# Patient Record
(demographics unavailable — no encounter records)

---

## 2024-11-01 NOTE — PHYSICAL EXAM
[Alert] : alert [No Acute Distress] : no acute distress [Playful] : playful [Normocephalic] : normocephalic [Conjunctivae with no discharge] : conjunctivae with no discharge [PERRL] : PERRL [EOMI Bilateral] : EOMI bilateral [Auricles Well Formed] : auricles well formed [Clear Tympanic membranes with present light reflex and bony landmarks] : clear tympanic membranes with present light reflex and bony landmarks [No Discharge] : no discharge [Nares Patent] : nares patent [Pink Nasal Mucosa] : pink nasal mucosa [Palate Intact] : palate intact [Uvula Midline] : uvula midline [Nonerythematous Oropharynx] : nonerythematous oropharynx [No Caries] : no caries [Trachea Midline] : trachea midline [Supple, full passive range of motion] : supple, full passive range of motion [No Palpable Masses] : no palpable masses [Symmetric Chest Rise] : symmetric chest rise [Clear to Auscultation Bilaterally] : clear to auscultation bilaterally [Normoactive Precordium] : normoactive precordium [Regular Rate and Rhythm] : regular rate and rhythm [Normal S1, S2 present] : normal S1, S2 present [No Murmurs] : no murmurs [+2 Femoral Pulses] : +2 femoral pulses [Soft] : soft [NonTender] : non tender [Non Distended] : non distended [Normoactive Bowel Sounds] : normoactive bowel sounds [No Hepatomegaly] : no hepatomegaly [No Splenomegaly] : no splenomegaly [Kme 1] : Kem 1 [Central Urethral Opening] : central urethral opening [Testicles Descended Bilaterally] : testicles descended bilaterally [Patent] : patent [Normally Placed] : normally placed [No Abnormal Lymph Nodes Palpated] : no abnormal lymph nodes palpated [Symmetric Buttocks Creases] : symmetric buttocks creases [Symmetric Hip Rotation] : symmetric hip rotation [No Gait Asymmetry] : no gait asymmetry [No pain or deformities with palpation of bone, muscles, joints] : no pain or deformities with palpation of bone, muscles, joints [Normal Muscle Tone] : normal muscle tone [No Spinal Dimple] : no spinal dimple [NoTuft of Hair] : no tuft of hair [Straight] : straight [+2 Patella DTR] : +2 patella DTR [Cranial Nerves Grossly Intact] : cranial nerves grossly intact [No Rash or Lesions] : no rash or lesions [Uncircumcised] : uncircumcised [FreeTextEntry6] : small amount of redness underneath the foreskin

## 2024-11-01 NOTE — HISTORY OF PRESENT ILLNESS
[Mother] : mother [Sugar drinks] : sugar drinks [Fruit] : fruit [Vegetables] : vegetables [Meat] : meat [Grains] : grains [Eggs] : eggs [Fish] : fish [Dairy] : dairy [___ stools per day] : [unfilled]  stools per day [Toilet Trained] :  toilet trained [Normal] : Normal [Brushing teeth] : Brushing teeth [Yes] : Patient goes to dentist yearly [Toothpaste] : Primary Fluoride Source: Toothpaste [Playtime (60 min/d)] : Playtime 60 min a day [< 2 hrs of screen time] : Less than 2 hrs of screen time [In ] : In  [No] : Not at  exposure [Water heater temperature set at <120 degrees F] : Water heater temperature set at <120 degrees F [Car seat in back seat] : Car seat in back seat [Carbon Monoxide Detectors] : Carbon monoxide detectors [Smoke Detectors] : Smoke detectors [Supervised outdoor play] : Supervised outdoor play [FreeTextEntry7] : 722595 [Exposure to electronic nicotine delivery system] : No exposure to electronic nicotine delivery system [de-identified] :  127312 [de-identified] : no IEP, no therapies  [de-identified] : not a healthy eater,  [FreeTextEntry1] : both have the cold- cough is improving 2 weeks, no fevers,  irritation on the penis- 3 days, slightly painful, is improving

## 2024-11-01 NOTE — PHYSICAL EXAM
[Alert] : alert [No Acute Distress] : no acute distress [Playful] : playful [Normocephalic] : normocephalic [Conjunctivae with no discharge] : conjunctivae with no discharge [PERRL] : PERRL [EOMI Bilateral] : EOMI bilateral [Auricles Well Formed] : auricles well formed [Clear Tympanic membranes with present light reflex and bony landmarks] : clear tympanic membranes with present light reflex and bony landmarks [No Discharge] : no discharge [Nares Patent] : nares patent [Pink Nasal Mucosa] : pink nasal mucosa [Palate Intact] : palate intact [Uvula Midline] : uvula midline [Nonerythematous Oropharynx] : nonerythematous oropharynx [No Caries] : no caries [Trachea Midline] : trachea midline [Supple, full passive range of motion] : supple, full passive range of motion [No Palpable Masses] : no palpable masses [Symmetric Chest Rise] : symmetric chest rise [Clear to Auscultation Bilaterally] : clear to auscultation bilaterally [Normoactive Precordium] : normoactive precordium [Regular Rate and Rhythm] : regular rate and rhythm [Normal S1, S2 present] : normal S1, S2 present [No Murmurs] : no murmurs [+2 Femoral Pulses] : +2 femoral pulses [Soft] : soft [NonTender] : non tender [Non Distended] : non distended [Normoactive Bowel Sounds] : normoactive bowel sounds [No Hepatomegaly] : no hepatomegaly [No Splenomegaly] : no splenomegaly [Kem 1] : Kem 1 [Central Urethral Opening] : central urethral opening [Testicles Descended Bilaterally] : testicles descended bilaterally [Patent] : patent [Normally Placed] : normally placed [No Abnormal Lymph Nodes Palpated] : no abnormal lymph nodes palpated [Symmetric Buttocks Creases] : symmetric buttocks creases [Symmetric Hip Rotation] : symmetric hip rotation [No Gait Asymmetry] : no gait asymmetry [No pain or deformities with palpation of bone, muscles, joints] : no pain or deformities with palpation of bone, muscles, joints [Normal Muscle Tone] : normal muscle tone [No Spinal Dimple] : no spinal dimple [NoTuft of Hair] : no tuft of hair [Straight] : straight [+2 Patella DTR] : +2 patella DTR [Cranial Nerves Grossly Intact] : cranial nerves grossly intact [No Rash or Lesions] : no rash or lesions [Uncircumcised] : uncircumcised [FreeTextEntry6] : small amount of redness underneath the foreskin

## 2024-11-01 NOTE — HISTORY OF PRESENT ILLNESS
[Mother] : mother [Sugar drinks] : sugar drinks [Fruit] : fruit [Vegetables] : vegetables [Meat] : meat [Grains] : grains [Eggs] : eggs [Fish] : fish [Dairy] : dairy [___ stools per day] : [unfilled]  stools per day [Toilet Trained] :  toilet trained [Normal] : Normal [Brushing teeth] : Brushing teeth [Yes] : Patient goes to dentist yearly [Toothpaste] : Primary Fluoride Source: Toothpaste [Playtime (60 min/d)] : Playtime 60 min a day [< 2 hrs of screen time] : Less than 2 hrs of screen time [In ] : In  [No] : Not at  exposure [Water heater temperature set at <120 degrees F] : Water heater temperature set at <120 degrees F [Car seat in back seat] : Car seat in back seat [Carbon Monoxide Detectors] : Carbon monoxide detectors [Smoke Detectors] : Smoke detectors [Supervised outdoor play] : Supervised outdoor play [FreeTextEntry7] : 547703 [Exposure to electronic nicotine delivery system] : No exposure to electronic nicotine delivery system [de-identified] :  [id number] [de-identified] : no IEP, no therapies  [de-identified] : not a healthy eater,  [FreeTextEntry1] : both have the cold- cough is improving 2 weeks, no fevers,  irritation on the penis- 3 days, slightly painful, is improving

## 2024-11-01 NOTE — HISTORY OF PRESENT ILLNESS
[Mother] : mother [Sugar drinks] : sugar drinks [Fruit] : fruit [Vegetables] : vegetables [Meat] : meat [Grains] : grains [Eggs] : eggs [Fish] : fish [Dairy] : dairy [___ stools per day] : [unfilled]  stools per day [Toilet Trained] :  toilet trained [Normal] : Normal [Brushing teeth] : Brushing teeth [Yes] : Patient goes to dentist yearly [Toothpaste] : Primary Fluoride Source: Toothpaste [Playtime (60 min/d)] : Playtime 60 min a day [< 2 hrs of screen time] : Less than 2 hrs of screen time [In ] : In  [No] : Not at  exposure [Water heater temperature set at <120 degrees F] : Water heater temperature set at <120 degrees F [Car seat in back seat] : Car seat in back seat [Carbon Monoxide Detectors] : Carbon monoxide detectors [Smoke Detectors] : Smoke detectors [Supervised outdoor play] : Supervised outdoor play [FreeTextEntry7] : 150472 [Exposure to electronic nicotine delivery system] : No exposure to electronic nicotine delivery system [de-identified] :  [id number] [de-identified] : no IEP, no therapies  [de-identified] : not a healthy eater,  [FreeTextEntry1] : both have the cold- cough is improving 2 weeks, no fevers,  irritation on the penis- 3 days, slightly painful, is improving

## 2024-11-01 NOTE — HISTORY OF PRESENT ILLNESS
[Mother] : mother [Sugar drinks] : sugar drinks [Fruit] : fruit [Vegetables] : vegetables [Meat] : meat [Grains] : grains [Eggs] : eggs [Fish] : fish [Dairy] : dairy [___ stools per day] : [unfilled]  stools per day [Toilet Trained] :  toilet trained [Normal] : Normal [Brushing teeth] : Brushing teeth [Yes] : Patient goes to dentist yearly [Toothpaste] : Primary Fluoride Source: Toothpaste [Playtime (60 min/d)] : Playtime 60 min a day [< 2 hrs of screen time] : Less than 2 hrs of screen time [In ] : In  [No] : Not at  exposure [Water heater temperature set at <120 degrees F] : Water heater temperature set at <120 degrees F [Car seat in back seat] : Car seat in back seat [Carbon Monoxide Detectors] : Carbon monoxide detectors [Smoke Detectors] : Smoke detectors [Supervised outdoor play] : Supervised outdoor play [FreeTextEntry7] : [id number] [Exposure to electronic nicotine delivery system] : No exposure to electronic nicotine delivery system [de-identified] :  [id number] [de-identified] : no IEP, no therapies  [de-identified] : not a healthy eater,  [FreeTextEntry1] : both have the cold- cough is improving 2 weeks, no fevers,  irritation on the penis- 3 days, slightly painful, is improving

## 2024-11-01 NOTE — DISCUSSION/SUMMARY
Talked with pt   [Normal Development] : development  [None] : no medical problems [Full Activity without restrictions including Physical Education & Athletics] : Full Activity without restrictions including Physical Education & Athletics [] : The components of the vaccine(s) to be administered today are listed in the plan of care. The disease(s) for which the vaccine(s) are intended to prevent and the risks have been discussed with the caretaker.  The risks are also included in the appropriate vaccination information statements which have been provided to the patient's caregiver.  The caregiver has given consent to vaccinate. [FreeTextEntry1] : Continue balanced diet with all food groups. Brush teeth twice a day with toothbrush. Recommend visit to dentist. Help child to maintain consistent daily routines and sleep schedule. School discussed. Ensure home is safe. Teach child about personal safety. Use consistent, positive discipline. Limit screen time to no more than 2 hours per day. Encourage physical activity. Child needs to ride in a belt-positioning booster seat until  4 feet 9 inches has been reached and are between 8 and 12 years of age. Nutritional counseling and suggestions on lifestyle modifications provided. Dietary goals will be discussed by myself in subsequent visits. Consider doing fasting bloodwork in the future when well.  Patient with balanitis, counseled on cream use, counseled on supportive care including improved hygeine. Return if symptoms worsen or persist.  Dtap/IPV today, tolerated well.

## 2024-11-01 NOTE — DISCUSSION/SUMMARY
[Normal Development] : development  [None] : no medical problems [Full Activity without restrictions including Physical Education & Athletics] : Full Activity without restrictions including Physical Education & Athletics [] : The components of the vaccine(s) to be administered today are listed in the plan of care. The disease(s) for which the vaccine(s) are intended to prevent and the risks have been discussed with the caretaker.  The risks are also included in the appropriate vaccination information statements which have been provided to the patient's caregiver.  The caregiver has given consent to vaccinate. [FreeTextEntry1] : Continue balanced diet with all food groups. Brush teeth twice a day with toothbrush. Recommend visit to dentist. Help child to maintain consistent daily routines and sleep schedule. School discussed. Ensure home is safe. Teach child about personal safety. Use consistent, positive discipline. Limit screen time to no more than 2 hours per day. Encourage physical activity. Child needs to ride in a belt-positioning booster seat until  4 feet 9 inches has been reached and are between 8 and 12 years of age. Nutritional counseling and suggestions on lifestyle modifications provided. Dietary goals will be discussed by myself in subsequent visits. Consider doing fasting bloodwork in the future when well.  Patient with balanitis, counseled on cream use, counseled on supportive care including improved hygeine. Return if symptoms worsen or persist.  Dtap/IPV today, tolerated well.

## 2025-05-21 NOTE — PHYSICAL EXAM
[Soft] : soft [Tender] : nontender [Distended] : nondistended [Normal Bowel Sounds] : normal bowel sounds [NL] : warm, clear

## 2025-05-21 NOTE — HISTORY OF PRESENT ILLNESS
[de-identified] : diarrhea, nose bleeds [FreeTextEntry6] : Mother states pt has had diarrhea from may 10-14 vomiting and fever on may 11th that resolved in one day  child needs letter to return to school has no diarrhea, vomiting, or fever for over 1 week

## 2025-05-21 NOTE — BEGINNING OF VISIT
[Parents] : parents Tremfya Counseling: I discussed with the patient the risks of guselkumab including but not limited to immunosuppression, serious infections, and drug reactions.  The patient understands that monitoring is required including a PPD at baseline and must alert us or the primary physician if symptoms of infection or other concerning signs are noted.

## 2025-05-21 NOTE — DISCUSSION/SUMMARY
[FreeTextEntry1] : Discussed with mother likely viral  recommended daily probiotic, increase fluids can return to school